# Patient Record
Sex: MALE | Race: WHITE
[De-identification: names, ages, dates, MRNs, and addresses within clinical notes are randomized per-mention and may not be internally consistent; named-entity substitution may affect disease eponyms.]

---

## 2017-01-02 ENCOUNTER — HOSPITAL ENCOUNTER (EMERGENCY)
Dept: HOSPITAL 62 - ER | Age: 57
Discharge: HOME | End: 2017-01-02
Payer: SELF-PAY

## 2017-01-02 VITALS — SYSTOLIC BLOOD PRESSURE: 141 MMHG | DIASTOLIC BLOOD PRESSURE: 77 MMHG

## 2017-01-02 DIAGNOSIS — I10: ICD-10-CM

## 2017-01-02 DIAGNOSIS — E78.00: ICD-10-CM

## 2017-01-02 DIAGNOSIS — Z95.1: ICD-10-CM

## 2017-01-02 DIAGNOSIS — Z88.0: ICD-10-CM

## 2017-01-02 DIAGNOSIS — T81.4XXA: Primary | ICD-10-CM

## 2017-01-02 DIAGNOSIS — Z87.891: ICD-10-CM

## 2017-01-02 DIAGNOSIS — Z88.2: ICD-10-CM

## 2017-01-02 LAB
ALBUMIN SERPL-MCNC: 4.2 G/DL (ref 3.5–5)
ALP SERPL-CCNC: 101 U/L (ref 38–126)
ALT SERPL-CCNC: 27 U/L (ref 21–72)
ANION GAP SERPL CALC-SCNC: 13 MMOL/L (ref 5–19)
APPEARANCE UR: (no result)
AST SERPL-CCNC: 25 U/L (ref 17–59)
BASOPHILS # BLD AUTO: 0.1 10^3/UL (ref 0–0.2)
BASOPHILS NFR BLD AUTO: 1 % (ref 0–2)
BILIRUB DIRECT SERPL-MCNC: 0 MG/DL (ref 0–0.3)
BILIRUB SERPL-MCNC: 0.5 MG/DL (ref 0.2–1.3)
BILIRUB UR QL STRIP: NEGATIVE
BUN SERPL-MCNC: 18 MG/DL (ref 7–20)
CALCIUM: 9.2 MG/DL (ref 8.4–10.2)
CHLORIDE SERPL-SCNC: 105 MMOL/L (ref 98–107)
CK MB SERPL-MCNC: 0.62 NG/ML (ref ?–4.55)
CK SERPL-CCNC: 86 U/L (ref 55–170)
CO2 SERPL-SCNC: 28 MMOL/L (ref 22–30)
CREAT SERPL-MCNC: 0.93 MG/DL (ref 0.52–1.25)
EOSINOPHIL # BLD AUTO: 2.2 10^3/UL (ref 0–0.6)
EOSINOPHIL NFR BLD AUTO: 17.3 % (ref 0–6)
ERYTHROCYTE [DISTWIDTH] IN BLOOD BY AUTOMATED COUNT: 13.4 % (ref 11.5–14)
GLUCOSE SERPL-MCNC: 79 MG/DL (ref 75–110)
GLUCOSE UR STRIP-MCNC: NEGATIVE MG/DL
HCT VFR BLD CALC: 36.4 % (ref 37.9–51)
HGB BLD-MCNC: 12.4 G/DL (ref 13.5–17)
HGB HCT DIFFERENCE: 0.8
KETONES UR STRIP-MCNC: (no result) MG/DL
LYMPHOCYTES # BLD AUTO: 3 10^3/UL (ref 0.5–4.7)
LYMPHOCYTES NFR BLD AUTO: 23.8 % (ref 13–45)
MCH RBC QN AUTO: 28.6 PG (ref 27–33.4)
MCHC RBC AUTO-ENTMCNC: 33.9 G/DL (ref 32–36)
MCV RBC AUTO: 84 FL (ref 80–97)
MONOCYTES # BLD AUTO: 0.8 10^3/UL (ref 0.1–1.4)
MONOCYTES NFR BLD AUTO: 6.4 % (ref 3–13)
NEUTROPHILS # BLD AUTO: 6.5 10^3/UL (ref 1.7–8.2)
NEUTS SEG NFR BLD AUTO: 51.5 % (ref 42–78)
NITRITE UR QL STRIP: NEGATIVE
PH UR STRIP: 6 [PH] (ref 5–9)
POTASSIUM SERPL-SCNC: 4 MMOL/L (ref 3.6–5)
PROT SERPL-MCNC: 6.8 G/DL (ref 6.3–8.2)
PROT UR STRIP-MCNC: 30 MG/DL
RBC # BLD AUTO: 4.32 10^6/UL (ref 4.35–5.55)
SODIUM SERPL-SCNC: 145.8 MMOL/L (ref 137–145)
SP GR UR STRIP: 1.03
TROPONIN I SERPL-MCNC: 0.01 NG/ML
UROBILINOGEN UR-MCNC: 2 MG/DL (ref ?–2)
WBC # BLD AUTO: 12.7 10^3/UL (ref 4–10.5)

## 2017-01-02 PROCEDURE — 93005 ELECTROCARDIOGRAM TRACING: CPT

## 2017-01-02 PROCEDURE — 87077 CULTURE AEROBIC IDENTIFY: CPT

## 2017-01-02 PROCEDURE — 99284 EMERGENCY DEPT VISIT MOD MDM: CPT

## 2017-01-02 PROCEDURE — 85025 COMPLETE CBC W/AUTO DIFF WBC: CPT

## 2017-01-02 PROCEDURE — 87186 SC STD MICRODIL/AGAR DIL: CPT

## 2017-01-02 PROCEDURE — 84484 ASSAY OF TROPONIN QUANT: CPT

## 2017-01-02 PROCEDURE — 93010 ELECTROCARDIOGRAM REPORT: CPT

## 2017-01-02 PROCEDURE — 81001 URINALYSIS AUTO W/SCOPE: CPT

## 2017-01-02 PROCEDURE — 36415 COLL VENOUS BLD VENIPUNCTURE: CPT

## 2017-01-02 PROCEDURE — 80053 COMPREHEN METABOLIC PANEL: CPT

## 2017-01-02 PROCEDURE — 82553 CREATINE MB FRACTION: CPT

## 2017-01-02 PROCEDURE — 71020: CPT

## 2017-01-02 PROCEDURE — 82550 ASSAY OF CK (CPK): CPT

## 2017-01-02 PROCEDURE — 96372 THER/PROPH/DIAG INJ SC/IM: CPT

## 2017-01-02 PROCEDURE — 87205 SMEAR GRAM STAIN: CPT

## 2017-01-02 PROCEDURE — 87070 CULTURE OTHR SPECIMN AEROBIC: CPT

## 2017-01-02 NOTE — EKG REPORT
SEVERITY:- ABNORMAL ECG -

SINUS RHYTHM

PROBABLE LEFT ATRIAL ABNORMALITY

NONSPECIFIC INTRAVENTRICULAR CONDUCTION DELAY

CONSIDER ANTERIOR INFARCT

:

Confirmed by: Roslyn Sol 02-Jan-2017 22:04:50

## 2017-01-02 NOTE — ER DOCUMENT REPORT
ED Medical Screen (RME)





- General


Chief Complaint: Post Surgical Pain


Stated Complaint: POSSIBLE INFECTION


Mode of Arrival: Ambulatory


Information source: Patient


Notes: 


Patient is complaining of upper chest pain, low back pain and pain at incision 

site in epigastric region. He has had pain for the past week - the pain started 

as sharp severe intermittent pain in his lower back that has relieved some. The 

chest pain is described as dull, intermittent, worse with movement. He has 

history [place] [facility] [place] The pain at the incision site 

in upper abdomen that he states is from one of the chest tubes - dark yellow 

pus noted, causing them to change the bandages 3-4 times/daily. endorses 

associated erythema, swelling and pain but denies bleeding from site. 

Associated symptoms include fever (Tmax 101), chills, nausea, SOB, vomiting x1 

episode, diarrhea ut denies dysuria. He has tried aleve, ibuprofen and tylenol 

which is not providing relief. 


Currently on plavix. 


TRAVEL OUTSIDE OF THE [place] IN LAST 30 DAYS: No





- Related Data


Allergies/Adverse Reactions: 


 





Penicillins Allergy (Unknown, Verified 01/02/17 13:21)


 











Past Medical History





- Past Medical History


Cardiac Medical History: Reports: Hx Hypercholesterolemia, Hx Hypertension


   Denies: Hx Coronary Artery Disease


Traumatic Medical History: Reports: Hx Fractures - skull


Past Surgical History: Reports: Other - bladder





- Immunizations


Hx Diphtheria, Pertussis, Tetanus Vaccination: No





Review of Systems





- Review of Systems


Constitutional: See HPI


Cardiovascular: See HPI


Respiratory: See HPI


Skin: See HPI





Physical Exam





- Vital signs


Vitals: 





 











Temp Pulse Resp BP Pulse Ox


 


 97.4 F   75   20   159/87 H  98 


 


 01/02/17 13:17  01/02/17 13:17  01/02/17 13:17  01/02/17 13:17  01/02/17 13:17














- Notes


Notes: 


General: appears comfortable, steady gait, VSS.


Skin: Multiple incisional scars noted to upper chest and abdomen. 1.5 cm 

incision noted with erythema, yellow drainage, warmth and tender to palpation.





Course





- Re-evaluation


Re-evalutation: 


01/02/17 14:28


Patient seen and examined. VSS. Significant cardiac history with recent CABG on 

12/7/16. Ordered lab work, ekg, chest xray. Currently on plavix. 











- Vital Signs


Vital signs: 





 











Temp Pulse Resp BP Pulse Ox


 


 97.4 F   75   20   159/87 H  98 


 


 01/02/17 13:17  01/02/17 13:17  01/02/17 13:17  01/02/17 13:17  01/02/17 13:17

## 2017-01-02 NOTE — ER DOCUMENT REPORT
ED General





- General


Chief Complaint: Post Surgical Pain


Stated Complaint: POSSIBLE INFECTION


Time seen by provider: 15:27


Mode of Arrival: Ambulatory


Information source: Patient, Relative


TRAVEL OUTSIDE OF THE [place] IN LAST 30 DAYS: No





- HPI


Onset: Other - pt states he has had pus draining from one of the incision sites 

from the CABG surgery he had last month at [facility].  He denies fever.  He has had 

brief twinges of chest discomfort after the surgery but nothing sustained.  Has 

had some "kidney pain " over the past few days, but denies dysuria, hematuria





- Related Data


Allergies/Adverse Reactions: 


 





Penicillins Allergy (Unknown, Verified 01/02/17 13:21)


 


Sulfa (Sulfonamide Antibiotics) Allergy (Verified 01/02/17 15:59)


 











Past Medical History





- General


Information source: Patient, Relative





- Social History


Smoking Status: Former Smoker


Cigarette use (# per day): No


Chew tobacco use (# tins/day): No


Smoking Education Provided: No


Family History: CAD





- Past Medical History


Cardiac Medical History: Reports: Hx Hypercholesterolemia, Hx Hypertension


   Denies: Hx Coronary Artery Disease


Traumatic Medical History: Reports: Hx Fractures - skull


Past Surgical History: Reports: Other - bladder





- Immunizations


Hx Diphtheria, Pertussis, Tetanus Vaccination: No





Review of Systems





- Review of Systems


Constitutional: No symptoms reported


Cardiovascular: See HPI, Chest pain


Respiratory: No symptoms reported


Gastrointestinal: No symptoms reported


Musculoskeletal: No symptoms reported


Skin: See HPI, Other - draining wound


Neurological/Psychological: No symptoms reported


-: Yes All other systems reviewed and negative





Physical Exam





- Vital signs


Vitals: 


 











Temp Pulse Resp BP Pulse Ox


 


 97.4 F   75   20   159/87 H  98 


 


 01/02/17 13:17  01/02/17 13:17  01/02/17 13:17  01/02/17 13:17  01/02/17 13:17














- General


General appearance: Appears well


In distress: None





- HEENT


Pharynx: Normal


Neck: Normal





- Respiratory


Respiratory status: No respiratory distress


Chest status: Nontender


Breath sounds: Normal


Chest palpation: Other - there is a 2 cm post-op wound with yellow borders and 

small amount of yellow pus at the entrance.  We will do wound culture today.  

The other post-op woounds on the chest and healing well without signs of 

infection.





- Cardiovascular


Rhythm: Regular


Heart sounds: Normal auscultation





- Abdominal


Inspection: Normal


Tenderness: Nontender





- Back


Back: Normal





- Extremities


General upper extremity: Normal inspection


General lower extremity: Normal inspection





- Neurological


Neuro grossly intact: Yes


Cognition: Normal


Orientation: AAOx4


Speech: Normal





Course





- Re-evaluation


Re-evalutation: 





01/02/17 16:59


pt's exam unchanged -- we will give him a parenteral dose of abc here -- he has 

expressed desire to go home





- Vital Signs


Vital signs: 


 











Temp Pulse Resp BP Pulse Ox


 


 97.4 F   75   14   160/89 H  99 


 


 01/02/17 13:17  01/02/17 13:17  01/02/17 15:34  01/02/17 15:34  01/02/17 15:34














- Laboratory


Result Diagrams: 


 01/02/17 15:20





 01/02/17 15:20


Laboratory results interpreted by me: 


 











  01/02/17 01/02/17 01/02/17





  15:20 15:20 15:50


 


WBC  12.7 H  


 


RBC  4.32 L  


 


Hgb  12.4 L  


 


Hct  36.4 L  


 


Eosinophils %  17.3 H  


 


Absolute Eosinophils  2.2 H  


 


Sodium   145.8 H 


 


Urine Protein    30 H


 


Urine Ketones    TRACE H


 


Urine Urobilinogen    2.0 H


 


Ur Leukocyte Esterase    TRACE H














- EKG Interpretation by Me


EKG shows normal: Sinus rhythm - nsr with inverted T waves in V1 and 2 but no 

acute change





Discharge





- Discharge


Clinical Impression: 


Post-operative infection


Qualifiers:


 Encounter type: initial encounter Qualified Code(s): T81.4XXA - Infection 

following a procedure, initial encounter





Condition: Stable


Disposition: HOME, SELF-CARE


Additional Instructions: 


rest, take meds as prescribed, return if worse


Prescriptions: 


Clindamycin HCl [Cleocin 300 mg Capsule] 300 mg PO BID #14 capsule

## 2017-10-14 ENCOUNTER — HOSPITAL ENCOUNTER (INPATIENT)
Dept: HOSPITAL 62 - ER | Age: 57
LOS: 2 days | Discharge: HOME | DRG: 917 | End: 2017-10-16
Attending: INTERNAL MEDICINE | Admitting: INTERNAL MEDICINE
Payer: MEDICAID
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
## 2017-10-14 DIAGNOSIS — F32.9: ICD-10-CM

## 2017-10-14 DIAGNOSIS — T50.904A: Primary | ICD-10-CM

## 2017-10-14 DIAGNOSIS — Z63.5: ICD-10-CM

## 2017-10-14 DIAGNOSIS — E78.00: ICD-10-CM

## 2017-10-14 DIAGNOSIS — Z88.0: ICD-10-CM

## 2017-10-14 DIAGNOSIS — I10: ICD-10-CM

## 2017-10-14 DIAGNOSIS — I25.10: ICD-10-CM

## 2017-10-14 DIAGNOSIS — I25.2: ICD-10-CM

## 2017-10-14 DIAGNOSIS — Z88.2: ICD-10-CM

## 2017-10-14 DIAGNOSIS — E78.5: ICD-10-CM

## 2017-10-14 DIAGNOSIS — Z95.1: ICD-10-CM

## 2017-10-14 DIAGNOSIS — G92: ICD-10-CM

## 2017-10-14 DIAGNOSIS — Z82.49: ICD-10-CM

## 2017-10-14 LAB
ALBUMIN SERPL-MCNC: 4.5 G/DL (ref 3.5–5)
ALP SERPL-CCNC: 85 U/L (ref 38–126)
ALT SERPL-CCNC: 26 U/L (ref 21–72)
ANION GAP SERPL CALC-SCNC: 12 MMOL/L (ref 5–19)
AST SERPL-CCNC: 28 U/L (ref 17–59)
BARBITURATES UR QL SCN: NEGATIVE
BASOPHILS # BLD AUTO: 0.1 10^3/UL (ref 0–0.2)
BASOPHILS NFR BLD AUTO: 0.6 % (ref 0–2)
BILIRUB DIRECT SERPL-MCNC: 0.4 MG/DL (ref 0–0.4)
BILIRUB SERPL-MCNC: 0.6 MG/DL (ref 0.2–1.3)
BUN SERPL-MCNC: 14 MG/DL (ref 7–20)
CALCIUM: 9.7 MG/DL (ref 8.4–10.2)
CHLORIDE SERPL-SCNC: 107 MMOL/L (ref 98–107)
CO2 SERPL-SCNC: 24 MMOL/L (ref 22–30)
CREAT SERPL-MCNC: 1.17 MG/DL (ref 0.52–1.25)
EOSINOPHIL # BLD AUTO: 0.4 10^3/UL (ref 0–0.6)
EOSINOPHIL NFR BLD AUTO: 2.5 % (ref 0–6)
ERYTHROCYTE [DISTWIDTH] IN BLOOD BY AUTOMATED COUNT: 13.8 % (ref 11.5–14)
GLUCOSE SERPL-MCNC: 89 MG/DL (ref 75–110)
HCT VFR BLD CALC: 44 % (ref 37.9–51)
HGB BLD-MCNC: 15.3 G/DL (ref 13.5–17)
HGB HCT DIFFERENCE: 1.9
LYMPHOCYTES # BLD AUTO: 3.1 10^3/UL (ref 0.5–4.7)
LYMPHOCYTES NFR BLD AUTO: 20.7 % (ref 13–45)
MCH RBC QN AUTO: 29.7 PG (ref 27–33.4)
MCHC RBC AUTO-ENTMCNC: 34.7 G/DL (ref 32–36)
MCV RBC AUTO: 85 FL (ref 80–97)
METHADONE UR QL SCN: NEGATIVE
MONOCYTES # BLD AUTO: 1 10^3/UL (ref 0.1–1.4)
MONOCYTES NFR BLD AUTO: 6.9 % (ref 3–13)
NEUTROPHILS # BLD AUTO: 10.3 10^3/UL (ref 1.7–8.2)
NEUTS SEG NFR BLD AUTO: 69.3 % (ref 42–78)
PCP UR QL SCN: NEGATIVE
POTASSIUM SERPL-SCNC: 4.2 MMOL/L (ref 3.6–5)
PROT SERPL-MCNC: 7.6 G/DL (ref 6.3–8.2)
RBC # BLD AUTO: 5.16 10^6/UL (ref 4.35–5.55)
SODIUM SERPL-SCNC: 143.4 MMOL/L (ref 137–145)
URINE OPIATES LOW: NEGATIVE
WBC # BLD AUTO: 14.9 10^3/UL (ref 4–10.5)

## 2017-10-14 PROCEDURE — 84443 ASSAY THYROID STIM HORMONE: CPT

## 2017-10-14 PROCEDURE — 84484 ASSAY OF TROPONIN QUANT: CPT

## 2017-10-14 PROCEDURE — 93010 ELECTROCARDIOGRAM REPORT: CPT

## 2017-10-14 PROCEDURE — 80048 BASIC METABOLIC PNL TOTAL CA: CPT

## 2017-10-14 PROCEDURE — 99291 CRITICAL CARE FIRST HOUR: CPT

## 2017-10-14 PROCEDURE — 85025 COMPLETE CBC W/AUTO DIFF WBC: CPT

## 2017-10-14 PROCEDURE — 80307 DRUG TEST PRSMV CHEM ANLYZR: CPT

## 2017-10-14 PROCEDURE — 93005 ELECTROCARDIOGRAM TRACING: CPT

## 2017-10-14 PROCEDURE — 80069 RENAL FUNCTION PANEL: CPT

## 2017-10-14 PROCEDURE — 90686 IIV4 VACC NO PRSV 0.5 ML IM: CPT

## 2017-10-14 PROCEDURE — 36415 COLL VENOUS BLD VENIPUNCTURE: CPT

## 2017-10-14 PROCEDURE — 80053 COMPREHEN METABOLIC PANEL: CPT

## 2017-10-14 PROCEDURE — 70450 CT HEAD/BRAIN W/O DYE: CPT

## 2017-10-14 PROCEDURE — 82550 ASSAY OF CK (CPK): CPT

## 2017-10-14 RX ADMIN — SODIUM CHLORIDE AND POTASSIUM CHLORIDE PRN ML: 9; 2.98 INJECTION, SOLUTION INTRAVENOUS at 18:48

## 2017-10-14 RX ADMIN — LANSOPRAZOLE SCH MG: 15 TABLET, ORALLY DISINTEGRATING, DELAYED RELEASE ORAL at 18:40

## 2017-10-14 RX ADMIN — LORAZEPAM PRN MG: 2 INJECTION INTRAMUSCULAR; INTRAVENOUS at 18:42

## 2017-10-14 RX ADMIN — Medication SCH ML: at 21:03

## 2017-10-14 RX ADMIN — HYDRALAZINE HYDROCHLORIDE PRN MG: 20 INJECTION INTRAMUSCULAR; INTRAVENOUS at 21:14

## 2017-10-14 SDOH — SOCIAL STABILITY - SOCIAL INSECURITY: DISRUPTION OF FAMILY BY SEPARATION AND DIVORCE: Z63.5

## 2017-10-14 NOTE — ER DOCUMENT REPORT
ED General





- General


Chief Complaint: Possible Overdose


Stated Complaint: POSSIBLE OVERDOSE


Time Seen by Provider: 10/14/17 13:54


Information source: Patient, Relative, Emergency Med Personnel


Notes: 





Patient is a 57-year-old male with past medical history as recorded to 

supposedly took an unknown quantity of Benadryl on blood pressure medication 

pills secondary to an intentional overdose.  This was obtained by EMS as well 

as by calling his formal wife at [phone].  She supposedly "left him" around 4 

days ago.  She states that he has history of suicidal ideations.  She states 

the patient has had no recent infections, nausea, vomiting, or diarrhea.  She 

is uncertain which medications he took and how many he took.


TRAVEL OUTSIDE OF THE [place] IN LAST 30 DAYS: No





- HPI


Onset: Just prior to arrival


Onset/Duration: Sudden


Quality of pain: No pain


Severity: Severe


Pain Level: Denies


Associated symptoms: Other - Unable to obtain secondary to patient's condition


Exacerbated by: Other - Unable to obtain secondary to patient's condition


Relieved by: Other - Unable to obtain secondary to patient's condition


Similar symptoms previously: No





- Related Data


Allergies/Adverse Reactions: 


 





Penicillins Allergy (Unknown, Verified 01/02/17 13:21)


 


Sulfa (Sulfonamide Antibiotics) Allergy (Verified 01/02/17 15:59)


 











Past Medical History





- General


Information source: Patient





- Social History


Smoking Status: Unknown if Ever Smoked


Cigarette use (# per day): No - Unable to obtain secondary to patient's 

condition


Chew tobacco use (# tins/day): No - Unable to obtain secondary to patient's 

condition


Smoking Education Provided: No - Unable to obtain secondary to patient's 

condition


Frequency of alcohol use: None - Unable to obtain secondary to patient's 

conditionUnable to obtain secondary to patient's condition


Drug Abuse: None - Unable to obtain secondary to patient's condition


Family History: CAD





- Past Medical History


Cardiac Medical History: Reports: Hx Heart Attack, Hx Hypercholesterolemia, Hx 

Hypertension


   Denies: Hx Coronary Artery Disease


Traumatic Medical History: Reports: Hx Fractures - skull


Past Surgical History: Reports: Hx Cardiac Surgery - 5 vessel bypass, Other - 

bladder





- Immunizations


Hx Diphtheria, Pertussis, Tetanus Vaccination: No





Review of Systems





- Review of Systems


-: Yes ROS unobtainable due to patient's medical condition





Physical Exam





- Vital signs


Notes: 





Reviewed vital signs and nursing note as charted by RN.





CONSTITUTIONAL: Alert with mumbling answers.  I am unable to identify exactly 

what the patient is stating.  He appears to be protecting his airway





HEAD: Normocephalic; atraumatic





EYES: Sclerae non-icteric





ENT: Moist mucous membranes; pharynx without lesions noted





NECK: Supple without meningismus; non-tender





CARD: Regular rate and rhythm; no murmurs, no clicks, no rubs, no gallops; 

symmetric distal pulses





RESP: Normal chest excursion without splinting or tachypnea; breath sounds 

clear and equal bilaterally





ABD/GI: Normal bowel sounds; non-distended; soft, non-tender





BACK: The back appears normal and is non-tender to palpation





EXT: Normal ROM in all joints; non-tender to palpation; no cyanosis, no 

effusions, no edema





SKIN: Normal color for age and race; warm; dry; good turgor; capillary refill < 

2 seconds; no acute lesions noted





NEURO: CN II through XII are intact.  Moves all extremities equally; Motor and 

sensory function intact








Course





- Re-evaluation


Re-evalutation: 





10/14/17 15:19


Given the history and physical examination the patient was immediately placed 

on the monitor.  EKG, fluids, CT scan of the head, and labs were drawn.  





We were able to contact the patient's significant other at phone #[phone].





Patient appears to be protecting his airway.  I am concerned about the possible 

beta-blocker overdose.  A dose of intravenous calcium has been ordered.  Vital 

signs are currently stable.





EKG shows a heart rate of 97, possibly an accelerated junctional rhythm.  Right 

bundle branch block with a left posterior fascicular block, inverted T waves in 

lead aVF.  Old EKG from January 2, 2007 shows no obvious appreciable change to 

this EKG except possibly for the aVF inverted T waves.





10/14/17 15:22


Reviewing the patient's blood pressure medications it appears the patient was 

taking an ACE inhibitor with a diuretic.





10/14/17 15:23


CT scan of the head shows no acute abnormalities.





10/14/17 15:32


Patient is now answering simple questions.  He denies any pain.  Vitals as 

recorded.  Patient will be admitted to the hospitalist service until the 

patient is able to be medically cleared.








- Laboratory


Result Diagrams: 


 10/14/17 13:54





 10/14/17 13:54


Laboratory results interpreted by me: 


 











  10/14/17 10/14/17





  13:54 13:54


 


WBC  14.9 H 


 


Absolute Neutrophils  10.3 H 


 


Salicylates   < 1.0 L


 


Acetaminophen   < 10 L














Critical Care Note





- Critical Care Note


Total time excluding time spent on procedures (mins): 40





Discharge





- Discharge


Clinical Impression: 


Overdose


Qualifiers:


 Encounter type: initial encounter Injury intent: intentional self-harm 

Qualified Code(s): T50.902A - Poisoning by unspecified drugs, medicaments and 

biological substances, intentional self-harm, initial encounter





Condition: Fair


Disposition: ADMITTED AS INPATIENT


Admitting Provider: Hospitalist


Unit Admitted: [facility]
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
## 2017-10-14 NOTE — RADIOLOGY REPORT (SQ)
EXAM DESCRIPTION:  CT HEAD WITHOUT



COMPLETED DATE/TIME:  10/14/2017 2:14 pm



REASON FOR STUDY:  20, AMS



COMPARISON:  CT brain 2/19/2016, 1/6/2015



TECHNIQUE:  Axial images acquired through the brain without intravenous contrast.  Images reviewed wi
th bone, brain and subdural windows.  Images stored on PACS.

All CT scanners at this facility use dose modulation, iterative reconstruction, and/or weight based d
osing when appropriate to reduce radiation dose to as low as reasonably achievable (ALARA).

[facility]: Dose Right  [facility]: CareDose    [facility]: Dose Right    [facility]: Teradose 4D    [facility]: Smart Technologies



RADIATION DOSE:  Up-to-date CT equipment and radiation dose reduction techniques were employed. CTDIv
ol: 64.6 mGy. DLP: 1551 mGy-cm. mGy.



LIMITATIONS:  Motion artifact



FINDINGS:  Motion artifact throughout the study.

On images without motion artifact, there is no gross acute large territory ischemic change.  No acute
 hemorrhage.  No mass effect or midline shift.

There is an old infarct in the right caudate.

Postsurgical changes over the right parietal calvarium.



IMPRESSION:  Limited study.  No acute findings.  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

EVIDENCE OF ACUTE STROKE: NO.



COMMENT:  Quality ID # [id number]: Final reports with documentation of one or more dose reduction techniques
 (e.g., Automated exposure control, adjustment of the mA and/or kV according to patient size, use of 
iterative reconstruction technique)



TECHNICAL DOCUMENTATION:  JOB ID:  [id number]

 2011 [employer]- All Rights Reserved

## 2017-10-14 NOTE — EKG REPORT
SEVERITY:- ABNORMAL ECG -

ACCELERATED JUNCTIONAL RHYTHM

RBBB AND LPFB

:

Confirmed by: Paulo Martinez MD 14-Oct-2017 17:28:37

## 2017-10-14 NOTE — HISTORY AND PHYSICAL E
History and Physical



NAME: GINO CRESPO

MRN:  [id number]       : 1960   AGE: 57Y

ADMITTED: 10/14/2017                    ROOM: ED21

 



REASON FOR CONSULTATION:

Altered mental status, drug overdose.



HISTORY OF PRESENT ILLNESS:

The patient is a pleasant 57-year-old male who has a past medical history

of hypertension.  The patient was here last year, 2016.  At that

time he had chest pain and positive stress test.  He was transferred to

[facility].  The other history of his is hyperlipidemia.  The patient

was brought to the emergency room by his wife because of altered mental

status.  He had been  with his wife for the last 4 days.  He took

multiple medication in suicide attempt.  This included multiple Benadryl,

anti-blood pressure medications, and some medications of his friend and is

unknown.  He was confused; however, his vital signs in the emergency room

were unremarkable.  He denied any fever or chills or nausea or vomiting

here to give history, but he is confused.  He also has some paranoia and

shows restlessness and uncooperative and hallucinating.  He denies alcohol

use but he uses marijuana.  He had a CT scan in the emergency room that

was unremarkable.  He received IV fluids, 1 L NS.



REVIEW OF SYSTEMS:

Twelve systems are difficult to obtain.



PAST MEDICAL HISTORY:

1.  Hypertension.

2.  Hyperlipidemia.

3.  Coronary artery disease status post CABG.



PAST SURGICAL HISTORY:

1.  CABG.

2.  History of fracture



ALLERGIES:

1.  Penicillin.

2.  Bactrim.



HOME MEDICATIONS:

Unknown.



SOCIAL HISTORY:

The patient is  from his wife.  He smokes marijuana and no

drinking.



FAMILY HISTORY:

Coronary artery disease.



PHYSICAL EXAMINATION:

GENERAL:  The patient is lying in bed, restless.



VITAL SIGNS:  Blood pressure 136/107, heart rate 72, respiratory rate 30,

saturation 96%, afebrile.



HEENT:  Head is normocephalic, atraumatic.  Pupils are equal, round and

reactive to light and accommodation bilaterally.  Extraocular movements

are intact.  No headache or dizziness.  Ears:  Tympanic membranes intact

bilaterally.  No discharge from the ear, no discharge from the nose.



NECK:  Supple, no increased JVD, no thyromegaly, no lymphadenopathy.



CARDIOVASCULAR:  Normal S1,S2.  Regular rate and rhythm.  No murmur, no

gallop.



RESPIRATORY:  Good air entry bilaterally.  Lungs clear.



ABDOMEN:  Soft, nontender, no organomegaly, no deformity.  Bowel sounds

active.



MUSCULOSKELETAL:  No edema.



PERIPHERAL VASCULAR:  Peripheral pulses palpable.



SKIN:  There is no rash.



PSYCHIATRIC:  Anxious, agitated.



NEUROLOGIC:  No seizure, no history of seizures.



HEMATOLOGIC/LYMPHOCYTIC:  No anemia, no easy bruising.



DIAGNOSTIC DATA:

Labs:  White blood count 15.  Sodium 143, potassium 4.2, creatinine 1.1.



CT scan of the head is normal.



ASSESSMENT:

1.  Altered mental status, probably secondary to drug overdose, is

unknown.

2.  Drug overdose, possible Benadryl and antihypertensive medications.

3.  Coronary artery disease, status post CABG.

4.  Hyperlipidemia.

5.  Hypertension.

6.  Leukocytosis.



PLAN:

1.  The patient will be admitted.

2.  We will start him on IV fluids, D5 NS at 125 mL/h.

3.  Multivitamin.

4.  Ativan p.r.n.

5.  Morphine p.r.n.

6.  Zofran 40 mg p.r.n.

7.  Hydralazine p.r.n.

8.  Psychiatric consult.

9.  Labs tomorrow morning, we will add also PTH or TSH to the labs.



TIME SPENT:

One hour.





DICTATING PHYSICIAN: CHRISTAL THOMPSON M.D.





[id number]                  DT: 10/14/2017    1642

PHY#: [id number]            DD: 10/14/2017    1622

ID:   [id number]           JOB#: [id number]       ACCT: [id number]



cc:CHRISTAL THOMPSON M.D.

>







MTDD

## 2017-10-15 LAB
ANION GAP SERPL CALC-SCNC: 12 MMOL/L (ref 5–19)
BASOPHILS # BLD AUTO: 0.1 10^3/UL (ref 0–0.2)
BASOPHILS NFR BLD AUTO: 0.3 % (ref 0–2)
BUN SERPL-MCNC: 13 MG/DL (ref 7–20)
CALCIUM: 9.7 MG/DL (ref 8.4–10.2)
CHLORIDE SERPL-SCNC: 109 MMOL/L (ref 98–107)
CO2 SERPL-SCNC: 23 MMOL/L (ref 22–30)
CREAT SERPL-MCNC: 1.1 MG/DL (ref 0.52–1.25)
EOSINOPHIL # BLD AUTO: 0.2 10^3/UL (ref 0–0.6)
EOSINOPHIL NFR BLD AUTO: 1.4 % (ref 0–6)
ERYTHROCYTE [DISTWIDTH] IN BLOOD BY AUTOMATED COUNT: 13.9 % (ref 11.5–14)
GLUCOSE SERPL-MCNC: 103 MG/DL (ref 75–110)
HCT VFR BLD CALC: 43.4 % (ref 37.9–51)
HGB BLD-MCNC: 14.8 G/DL (ref 13.5–17)
HGB HCT DIFFERENCE: 1
LYMPHOCYTES # BLD AUTO: 2.8 10^3/UL (ref 0.5–4.7)
LYMPHOCYTES NFR BLD AUTO: 16.8 % (ref 13–45)
MCH RBC QN AUTO: 29.4 PG (ref 27–33.4)
MCHC RBC AUTO-ENTMCNC: 34.2 G/DL (ref 32–36)
MCV RBC AUTO: 86 FL (ref 80–97)
MONOCYTES # BLD AUTO: 0.9 10^3/UL (ref 0.1–1.4)
MONOCYTES NFR BLD AUTO: 5.5 % (ref 3–13)
NEUTROPHILS # BLD AUTO: 12.4 10^3/UL (ref 1.7–8.2)
NEUTS SEG NFR BLD AUTO: 76 % (ref 42–78)
POTASSIUM SERPL-SCNC: 3.7 MMOL/L (ref 3.6–5)
RBC # BLD AUTO: 5.05 10^6/UL (ref 4.35–5.55)
SODIUM SERPL-SCNC: 144.4 MMOL/L (ref 137–145)
WBC # BLD AUTO: 16.3 10^3/UL (ref 4–10.5)

## 2017-10-15 PROCEDURE — 3E0F73Z INTRODUCTION OF ANTI-INFLAMMATORY INTO RESPIRATORY TRACT, VIA NATURAL OR ARTIFICIAL OPENING: ICD-10-PCS | Performed by: FAMILY MEDICINE

## 2017-10-15 PROCEDURE — 3E0234Z INTRODUCTION OF SERUM, TOXOID AND VACCINE INTO MUSCLE, PERCUTANEOUS APPROACH: ICD-10-PCS | Performed by: FAMILY MEDICINE

## 2017-10-15 RX ADMIN — LANSOPRAZOLE SCH MG: 15 TABLET, ORALLY DISINTEGRATING, DELAYED RELEASE ORAL at 16:23

## 2017-10-15 RX ADMIN — SODIUM CHLORIDE AND POTASSIUM CHLORIDE PRN ML: 9; 2.98 INJECTION, SOLUTION INTRAVENOUS at 16:23

## 2017-10-15 RX ADMIN — SODIUM CHLORIDE AND POTASSIUM CHLORIDE PRN ML: 9; 2.98 INJECTION, SOLUTION INTRAVENOUS at 08:16

## 2017-10-15 RX ADMIN — HYDRALAZINE HYDROCHLORIDE PRN MG: 20 INJECTION INTRAMUSCULAR; INTRAVENOUS at 09:19

## 2017-10-15 RX ADMIN — Medication SCH ML: at 13:29

## 2017-10-15 RX ADMIN — FOLIC ACID SCH MG: 1 TABLET ORAL at 09:19

## 2017-10-15 RX ADMIN — LORAZEPAM PRN MG: 2 INJECTION INTRAMUSCULAR; INTRAVENOUS at 00:40

## 2017-10-15 RX ADMIN — MULTIVITAMIN TABLET SCH TAB: TABLET at 09:19

## 2017-10-15 RX ADMIN — ENOXAPARIN SODIUM SCH MG: 40 INJECTION SUBCUTANEOUS at 09:18

## 2017-10-15 RX ADMIN — Medication SCH MG: at 09:19

## 2017-10-15 RX ADMIN — Medication SCH ML: at 21:13

## 2017-10-15 RX ADMIN — Medication SCH ML: at 05:36

## 2017-10-15 RX ADMIN — LANSOPRAZOLE SCH MG: 15 TABLET, ORALLY DISINTEGRATING, DELAYED RELEASE ORAL at 05:36

## 2017-10-15 RX ADMIN — LISINOPRIL SCH MG: 10 TABLET ORAL at 08:34

## 2017-10-15 NOTE — PROGRESS NOTE E
Progress Note



NAME: GINO CRESPO

MRN: [id number]

: 1960             AGE: 57Y

DATE:  10/15/2017                    ROOM: 323



SUBJECTIVE:

The patient is a 57-year-old male, who has a past medical history of

hypertension.  He has coronary artery disease, had CABG.  The patient was

admitted yesterday with drug overdose.  It is unknown what he took. 

Probably he took Benadryl and antihypertensive medications.  Today, he is

more awake, alert, oriented.  The patient was seen briefly yesterday by

psychiatrist in the ER and they wanted the patient medically cleared.  He

is feeling much better today.



OBJECTIVE:

GENERAL:  Patient lying in bed, comfortable.  Not in distress.



VITAL SIGNS:  Blood pressure 185/105, temperature 98, respirations 28,

heart rate 71, saturation 100% on room air.



HEENT:  Normocephalic, atraumatic.  Pupils are round, reactive, and equal

to light and accommodation bilaterally.  Extraocular movements intact. 

Ears:  Tympanic membranes intact bilaterally.  No discharge from the ears.

No discharge from the nose.



NECK:  Supple.  No increased JVD.  No thyromegaly.  No lymphadenopathy.



CARDIOVASCULAR:  Normal S1 and S2.  Regular rate and rhythm.  No murmur. 

No gallop.



RESPIRATORY:  Lungs clear.



ABDOMEN:  Soft. Nontender.



MUSCULOSKELETAL:  No edema.



NEUROLOGIC:  Awake, alert.



DIAGNOSTIC DATA:

White blood count 16, hemoglobin 14, sodium 144, potassium 3.7, creatinine

1.1.



ASSESSMENT:

1.  ENCEPHALOPATHY, PROBABLY SECONDARY TO DRUG OVERDOSE.

2.  DRUG OVERDOSE WITH BENADRYL AND ANTIHYPERTENSIVE MEDICATION.

3.  DEPRESSION, WHICH IS 5/10.

4.  LEUKOCYTOSIS.

5.  HYPERTENSION, POORLY CONTROLLED.



PLAN:

We will continue IV fluids and continue monitoring.  Physical therapy. 

Control his blood pressure.  Await psychiatric evaluation.









DICTATING PHYSICIAN:  CHRISTAL THOMPSON M.D.





[id number]                  DT: 10/15/2017    0826

PHY#: [id number]            DD: 10/15/2017    0804

ID:   [id number]           JOB#: [id number]       ACCT: [id number]



cc:

>

## 2017-10-16 VITALS — SYSTOLIC BLOOD PRESSURE: 179 MMHG | DIASTOLIC BLOOD PRESSURE: 100 MMHG

## 2017-10-16 LAB
ALBUMIN SERPL-MCNC: 3.9 G/DL (ref 3.5–5)
ANION GAP SERPL CALC-SCNC: 13 MMOL/L (ref 5–19)
BASOPHILS # BLD AUTO: 0.1 10^3/UL (ref 0–0.2)
BASOPHILS NFR BLD AUTO: 0.8 % (ref 0–2)
BUN SERPL-MCNC: 15 MG/DL (ref 7–20)
CALCIUM: 9.3 MG/DL (ref 8.4–10.2)
CHLORIDE SERPL-SCNC: 111 MMOL/L (ref 98–107)
CO2 SERPL-SCNC: 20 MMOL/L (ref 22–30)
CREAT SERPL-MCNC: 1.12 MG/DL (ref 0.52–1.25)
EOSINOPHIL # BLD AUTO: 0.4 10^3/UL (ref 0–0.6)
EOSINOPHIL NFR BLD AUTO: 2.7 % (ref 0–6)
ERYTHROCYTE [DISTWIDTH] IN BLOOD BY AUTOMATED COUNT: 14 % (ref 11.5–14)
GLUCOSE SERPL-MCNC: 99 MG/DL (ref 75–110)
HCT VFR BLD CALC: 42.5 % (ref 37.9–51)
HGB BLD-MCNC: 14.5 G/DL (ref 13.5–17)
HGB HCT DIFFERENCE: 1
LYMPHOCYTES # BLD AUTO: 2.6 10^3/UL (ref 0.5–4.7)
LYMPHOCYTES NFR BLD AUTO: 18.8 % (ref 13–45)
MCH RBC QN AUTO: 29.5 PG (ref 27–33.4)
MCHC RBC AUTO-ENTMCNC: 34.1 G/DL (ref 32–36)
MCV RBC AUTO: 87 FL (ref 80–97)
MONOCYTES # BLD AUTO: 0.9 10^3/UL (ref 0.1–1.4)
MONOCYTES NFR BLD AUTO: 6.2 % (ref 3–13)
NEUTROPHILS # BLD AUTO: 10 10^3/UL (ref 1.7–8.2)
NEUTS SEG NFR BLD AUTO: 71.5 % (ref 42–78)
PHOSPHATE SERPL-MCNC: 3.1 MG/DL (ref 2.5–4.5)
POTASSIUM SERPL-SCNC: 3.9 MMOL/L (ref 3.6–5)
RBC # BLD AUTO: 4.91 10^6/UL (ref 4.35–5.55)
SODIUM SERPL-SCNC: 144.3 MMOL/L (ref 137–145)
WBC # BLD AUTO: 14 10^3/UL (ref 4–10.5)

## 2017-10-16 RX ADMIN — Medication SCH ML: at 13:07

## 2017-10-16 RX ADMIN — LANSOPRAZOLE SCH MG: 15 TABLET, ORALLY DISINTEGRATING, DELAYED RELEASE ORAL at 16:50

## 2017-10-16 RX ADMIN — MULTIVITAMIN TABLET SCH TAB: TABLET at 10:34

## 2017-10-16 RX ADMIN — HYDRALAZINE HYDROCHLORIDE PRN MG: 20 INJECTION INTRAMUSCULAR; INTRAVENOUS at 04:13

## 2017-10-16 RX ADMIN — Medication SCH ML: at 05:34

## 2017-10-16 RX ADMIN — LISINOPRIL SCH MG: 10 TABLET ORAL at 08:14

## 2017-10-16 RX ADMIN — LANSOPRAZOLE SCH MG: 15 TABLET, ORALLY DISINTEGRATING, DELAYED RELEASE ORAL at 07:00

## 2017-10-16 RX ADMIN — FOLIC ACID SCH MG: 1 TABLET ORAL at 10:34

## 2017-10-16 RX ADMIN — SODIUM CHLORIDE AND POTASSIUM CHLORIDE PRN ML: 9; 2.98 INJECTION, SOLUTION INTRAVENOUS at 10:45

## 2017-10-16 RX ADMIN — ENOXAPARIN SODIUM SCH MG: 40 INJECTION SUBCUTANEOUS at 10:36

## 2017-10-16 RX ADMIN — Medication SCH MG: at 10:34

## 2017-10-16 RX ADMIN — SODIUM CHLORIDE AND POTASSIUM CHLORIDE PRN ML: 9; 2.98 INJECTION, SOLUTION INTRAVENOUS at 00:00

## 2017-10-16 NOTE — PDOC DISCHARGE SUMMARY
General





- Admit/Disc Date/PCP


Admission Date/Primary Care Provider: 


  10/14/17 15:59





  


Primary Care MD: [facility]


Discharge Date: 10/16/17





- Discharge Diagnosis


(1) Toxic metabolic encephalopathy


Summary: 


Secondary to medication overdose.  Resolved








(2) Drug overdose, multiple drugs


Summary: 


At this point it is unclear whether this was an intentional overdose or not.  

The patient was placed on IVC papers.  He was evaluated by psychiatry on the 

day of discharge and his IVC was rescinded.  He will follow-up as an outpatient.








(3) Depression


Summary: 


He has been started on citalopram








(4) Coronary artery disease


Summary: 


Status post recent CABG.  Stable.  He will resume his home medications








(5) Hyperlipidemia


Summary: 


Continue home regimen








(6) Hypertension


Summary: 


Continue home regimen








(7) Leukocytosis


Summary: 


Likely reactive.  No evidence of infection








- Additional Information


Resuscitation Status: Full Code


Discharge Diet: Cardiac


Discharge Activity: Activity As Tolerated, Balance Activity w/Rest, Slowly 

Increase Activity


Home Medications: 








Citalopram Hydrobromide [Celexa 20 mg Tablet] 20 mg PO DAILY #30 tablet 10/16/

17 


Lisinopril [Prinivil 10 mg Tablet] 10 mg PO DAILY@0900 #30 tablet 10/16/17 











History of Present Illness


History of Present Illness: 


GINO CRESPO is a 57 year old male who presented to the emergency room with 

altered mental status








Hospital Course


Hospital Course: 





The patient is a pleasant 57-year-old  male with a past medical 

history significant for hypertension, hyperlipidemia and coronary artery 

disease.  In December 2016 the patient presented to the emergency room here 

with chest pain and a positive stress test.  He was transferred to [facility] where he underwent CABG as he was found to have multivessel 

disease.  Since that time the patient has been somewhat depressed.  He had been 

having marital difficulties at home and apparently took multiple medications 

and what appeared to be possible suicide attempt.  He took multiple Benadryl as 

well as anti-blood pressure medications and some medications of his friends 

which were unknown at the time of admission.  When he presented to the he was 

brought to the emergency room he was confused and I am unable to give an 

accurate history.  He was admitted to the hospital and placed under IVC papers.

  Over the next couple of days the patient's encephalopathy resolved.  He was 

seen by psychiatry and he has been started on antidepressive medication.  The 

patient states that he and his wife are doing much better at this point.  He 

states that he did not intend to kill himself and he just got confused with his 

medications.  In any event his IVC papers were rescinded today.  It is felt 

that he is stable for discharge with close outpatient follow-up.  We will make 

an appointment with his primary care provider and write prescriptions for his 

depression medicine going forward.  At this point maximum hospital benefits 

been reached.  Patient will be discharged home today in stable condition.





Physical Exam


Vital Signs: 


 











Temp Pulse Resp BP Pulse Ox


 


 98.7 F   59 L  16   161/83 H  98 


 


 10/16/17 15:10  10/16/17 15:10  10/16/17 15:10  10/16/17 15:10  10/16/17 15:10








 Intake & Output











 10/15/17 10/16/17 10/17/17





 06:59 06:59 06:59


 


Intake Total 900 4150 400


 


Output Total 525 700 300


 


Balance 375 3450 100


 


Weight 90.6 kg 92.2 kg 











General appearance: PRESENT: no acute distress, well-developed, well-nourished


Head exam: PRESENT: atraumatic, normocephalic


Mouth exam: PRESENT: moist, tongue midline


Respiratory exam: PRESENT: clear to auscultation elpidio.  ABSENT: rales, rhonchi, 

wheezes


Cardiovascular exam: PRESENT: RRR.  ABSENT: diastolic murmur, rubs, systolic 

murmur


GI/Abdominal exam: PRESENT: normal bowel sounds, soft.  ABSENT: distended, 

guarding, mass, organolmegaly, rebound, tenderness


Rectal exam: PRESENT: deferred


Extremities exam: PRESENT: full ROM.  ABSENT: calf tenderness, clubbing, pedal 

edema


Neurological exam: PRESENT: alert, awake, oriented to person, oriented to place

, oriented to time, oriented to situation, CN II-XII grossly intact.  ABSENT: 

motor sensory deficit


Psychiatric exam: PRESENT: appropriate affect, normal mood.  ABSENT: homicidal 

ideation, suicidal ideation


Skin exam: PRESENT: dry, intact, warm.  ABSENT: cyanosis, rash





Results


Laboratory Results: 


 





 10/16/17 05:23 





 10/16/17 05:23 





 











  10/16/17 10/16/17





  05:23 05:23


 


WBC  14.0 H 


 


RBC  4.91 


 


Hgb  14.5 


 


Hct  42.5 


 


MCV  87 


 


MCH  29.5 


 


MCHC  34.1 


 


RDW  14.0 


 


Plt Count  241 


 


Seg Neutrophils %  71.5 


 


Lymphocytes %  18.8 


 


Monocytes %  6.2 


 


Eosinophils %  2.7 


 


Basophils %  0.8 


 


Absolute Neutrophils  10.0 H 


 


Absolute Lymphocytes  2.6 


 


Absolute Monocytes  0.9 


 


Absolute Eosinophils  0.4 


 


Absolute Basophils  0.1 


 


Sodium   144.3


 


Potassium   3.9


 


Chloride   111 H


 


Carbon Dioxide   20 L


 


Anion Gap   13


 


BUN   15


 


Creatinine   1.12


 


Est GFR ( Amer)   > 60


 


Est GFR (Non-Af Amer)   > 60


 


Glucose   99


 


Calcium   9.3


 


Phosphorus   3.1


 


Albumin   3.9











Impressions: 


 





Head CT  10/14/17 13:55


IMPRESSION:  Limited study.  No acute findings.  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 

ganglia.


EVIDENCE OF ACUTE STROKE: NO.


 














Qualifiers


**PATEINT BEING DISCHARGED WITH ANY OF THE FOLLOWING DIAGNOSIS?: No





Plan


Time Spent: Greater than 30 Minutes

## 2017-10-17 ENCOUNTER — HOSPITAL ENCOUNTER (EMERGENCY)
Dept: HOSPITAL 62 - ER | Age: 57
LOS: 1 days | Discharge: HOME | End: 2017-10-18
Payer: COMMERCIAL

## 2017-10-17 DIAGNOSIS — F17.200: ICD-10-CM

## 2017-10-17 DIAGNOSIS — Z95.1: ICD-10-CM

## 2017-10-17 DIAGNOSIS — Z88.0: ICD-10-CM

## 2017-10-17 DIAGNOSIS — Z91.5: ICD-10-CM

## 2017-10-17 DIAGNOSIS — I25.2: ICD-10-CM

## 2017-10-17 DIAGNOSIS — F32.9: Primary | ICD-10-CM

## 2017-10-17 DIAGNOSIS — Z88.2: ICD-10-CM

## 2017-10-17 DIAGNOSIS — R45.851: ICD-10-CM

## 2017-10-17 DIAGNOSIS — Z59.0: ICD-10-CM

## 2017-10-17 DIAGNOSIS — Z63.0: ICD-10-CM

## 2017-10-17 DIAGNOSIS — I10: ICD-10-CM

## 2017-10-17 LAB
ALBUMIN SERPL-MCNC: 4.4 G/DL (ref 3.5–5)
ALP SERPL-CCNC: 89 U/L (ref 38–126)
ALT SERPL-CCNC: 32 U/L (ref 21–72)
ANION GAP SERPL CALC-SCNC: 12 MMOL/L (ref 5–19)
APPEARANCE UR: CLEAR
AST SERPL-CCNC: 29 U/L (ref 17–59)
BARBITURATES UR QL SCN: NEGATIVE
BASOPHILS # BLD AUTO: 0.1 10^3/UL (ref 0–0.2)
BASOPHILS NFR BLD AUTO: 0.5 % (ref 0–2)
BILIRUB DIRECT SERPL-MCNC: 0.5 MG/DL (ref 0–0.4)
BILIRUB SERPL-MCNC: 1 MG/DL (ref 0.2–1.3)
BILIRUB UR QL STRIP: NEGATIVE
BUN SERPL-MCNC: 16 MG/DL (ref 7–20)
CALCIUM: 9.7 MG/DL (ref 8.4–10.2)
CHLORIDE SERPL-SCNC: 107 MMOL/L (ref 98–107)
CO2 SERPL-SCNC: 25 MMOL/L (ref 22–30)
CREAT SERPL-MCNC: 1.32 MG/DL (ref 0.52–1.25)
EOSINOPHIL # BLD AUTO: 0.2 10^3/UL (ref 0–0.6)
EOSINOPHIL NFR BLD AUTO: 1.4 % (ref 0–6)
ERYTHROCYTE [DISTWIDTH] IN BLOOD BY AUTOMATED COUNT: 13.6 % (ref 11.5–14)
ETHANOL SERPL-MCNC: < 10 MG/DL
GLUCOSE SERPL-MCNC: 88 MG/DL (ref 75–110)
GLUCOSE UR STRIP-MCNC: NEGATIVE MG/DL
HCT VFR BLD CALC: 42.1 % (ref 37.9–51)
HGB BLD-MCNC: 14.3 G/DL (ref 13.5–17)
HGB HCT DIFFERENCE: 0.8
KETONES UR STRIP-MCNC: NEGATIVE MG/DL
LYMPHOCYTES # BLD AUTO: 1.9 10^3/UL (ref 0.5–4.7)
LYMPHOCYTES NFR BLD AUTO: 13.6 % (ref 13–45)
MCH RBC QN AUTO: 29.4 PG (ref 27–33.4)
MCHC RBC AUTO-ENTMCNC: 34 G/DL (ref 32–36)
MCV RBC AUTO: 87 FL (ref 80–97)
METHADONE UR QL SCN: NEGATIVE
MONOCYTES # BLD AUTO: 0.8 10^3/UL (ref 0.1–1.4)
MONOCYTES NFR BLD AUTO: 5.5 % (ref 3–13)
NEUTROPHILS # BLD AUTO: 11.3 10^3/UL (ref 1.7–8.2)
NEUTS SEG NFR BLD AUTO: 79 % (ref 42–78)
NITRITE UR QL STRIP: NEGATIVE
PCP UR QL SCN: NEGATIVE
PH UR STRIP: 5 [PH] (ref 5–9)
POTASSIUM SERPL-SCNC: 4.9 MMOL/L (ref 3.6–5)
PROT SERPL-MCNC: 6.9 G/DL (ref 6.3–8.2)
PROT UR STRIP-MCNC: NEGATIVE MG/DL
RBC # BLD AUTO: 4.87 10^6/UL (ref 4.35–5.55)
SODIUM SERPL-SCNC: 143.8 MMOL/L (ref 137–145)
SP GR UR STRIP: 1.01
URINE OPIATES LOW: NEGATIVE
UROBILINOGEN UR-MCNC: NEGATIVE MG/DL (ref ?–2)
WBC # BLD AUTO: 14.3 10^3/UL (ref 4–10.5)

## 2017-10-17 PROCEDURE — 80053 COMPREHEN METABOLIC PANEL: CPT

## 2017-10-17 PROCEDURE — 93005 ELECTROCARDIOGRAM TRACING: CPT

## 2017-10-17 PROCEDURE — 81001 URINALYSIS AUTO W/SCOPE: CPT

## 2017-10-17 PROCEDURE — 99285 EMERGENCY DEPT VISIT HI MDM: CPT

## 2017-10-17 PROCEDURE — 80307 DRUG TEST PRSMV CHEM ANLYZR: CPT

## 2017-10-17 PROCEDURE — 36415 COLL VENOUS BLD VENIPUNCTURE: CPT

## 2017-10-17 PROCEDURE — 93010 ELECTROCARDIOGRAM REPORT: CPT

## 2017-10-17 PROCEDURE — 85025 COMPLETE CBC W/AUTO DIFF WBC: CPT

## 2017-10-17 SDOH — SOCIAL STABILITY - SOCIAL INSECURITY: PROBLEMS IN RELATIONSHIP WITH SPOUSE OR PARTNER: Z63.0

## 2017-10-17 SDOH — ECONOMIC STABILITY - HOUSING INSECURITY: HOMELESSNESS: Z59.0

## 2017-10-17 NOTE — EKG REPORT
SEVERITY:- ABNORMAL ECG -

SINUS RHYTHM

PROBABLE LEFT ATRIAL ABNORMALITY

RIGHT BUNDLE BRANCH BLOCK

PROBABLE INFERIOR INFARCT, AGE INDETERMINATE

:

Confirmed by: Amy Hernández MD 17-Oct-2017 13:24:39
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
## 2017-10-17 NOTE — ER DOCUMENT REPORT
ED Medical Screen (RME)





- General


Chief Complaint: Psych Problem


Stated Complaint: PSYCH EVAL


Time Seen by Provider: 10/17/17 12:28


Notes: 





Patient states that he is having thoughts of wanting to kill himself.  He also 

states that he is having pain in his flanks bilaterally and has been having 

some problems urinating.  He was brought in by mobile crisis.


TRAVEL OUTSIDE OF THE [place] IN LAST 30 DAYS: No





- Related Data


Allergies/Adverse Reactions: 


 





Penicillins Allergy (Unknown, Verified 10/17/17 12:20)


 


Sulfa (Sulfonamide Antibiotics) Allergy (Verified 10/17/17 12:20)


 











Past Medical History





- Past Medical History


Cardiac Medical History: Reports: Hx Heart Attack, Hx Hypercholesterolemia, Hx 

Hypertension


   Denies: Hx Coronary Artery Disease


Renal/ Medical History: Denies: Hx Peritoneal Dialysis


Traumatic Medical History: Reports: Hx Fractures - skull


Past Surgical History: Reports: Hx Cardiac Surgery - 5 vessel bypass, Other - 

bladder





- Immunizations


Hx Diphtheria, Pertussis, Tetanus Vaccination: No


History of Influenza Vaccine for 10/2017 - 3/2018 Season: No





Physical Exam





- Vital signs


Vitals: 





 











Temp Pulse BP Pulse Ox


 


 98.6 F   75   165/93 H  97 


 


 10/17/17 12:19  10/17/17 12:19  10/17/17 12:19  10/17/17 12:19














Course





- Vital Signs


Vital signs: 





 











Temp Pulse Resp BP Pulse Ox


 


 98.6 F   75      165/93 H  97 


 


 10/17/17 12:19  10/17/17 12:19     10/17/17 12:19  10/17/17 12:19

## 2017-10-18 VITALS — SYSTOLIC BLOOD PRESSURE: 165 MMHG | DIASTOLIC BLOOD PRESSURE: 108 MMHG

## 2017-10-18 NOTE — ER DOCUMENT REPORT
Doctor's Note


Notes: 





10/18/17 09:46


The patient's blood pressure has been trending high.  He is known to have high 

blood pressure and be noncompliant.  He was given a prescription on discharge 2 

days ago for lisinopril and claims he could not fill it because he has no 

money.  He is a heavy smoker.  The prescription cost $4 a month.  He was given 

1 dose of lisinopril now and encouraged to get his prescription for his blood 

pressure medication filled.

## 2018-04-19 ENCOUNTER — HOSPITAL ENCOUNTER (EMERGENCY)
Dept: HOSPITAL 62 - ER | Age: 58
Discharge: HOME | End: 2018-04-19
Payer: MEDICAID

## 2018-04-19 VITALS — SYSTOLIC BLOOD PRESSURE: 99 MMHG | DIASTOLIC BLOOD PRESSURE: 51 MMHG

## 2018-04-19 DIAGNOSIS — R11.2: ICD-10-CM

## 2018-04-19 DIAGNOSIS — Z91.14: ICD-10-CM

## 2018-04-19 DIAGNOSIS — E78.5: ICD-10-CM

## 2018-04-19 DIAGNOSIS — E78.00: ICD-10-CM

## 2018-04-19 DIAGNOSIS — R06.02: ICD-10-CM

## 2018-04-19 DIAGNOSIS — I10: ICD-10-CM

## 2018-04-19 DIAGNOSIS — Z95.1: ICD-10-CM

## 2018-04-19 DIAGNOSIS — R07.9: ICD-10-CM

## 2018-04-19 DIAGNOSIS — R10.9: ICD-10-CM

## 2018-04-19 DIAGNOSIS — I25.2: ICD-10-CM

## 2018-04-19 DIAGNOSIS — R06.00: Primary | ICD-10-CM

## 2018-04-19 LAB
ADD MANUAL DIFF: NO
ALBUMIN SERPL-MCNC: 4.3 G/DL (ref 3.5–5)
ALP SERPL-CCNC: 86 U/L (ref 38–126)
ALT SERPL-CCNC: 25 U/L (ref 21–72)
ANION GAP SERPL CALC-SCNC: 11 MMOL/L (ref 5–19)
AST SERPL-CCNC: 26 U/L (ref 17–59)
BASOPHILS # BLD AUTO: 0.1 10^3/UL (ref 0–0.2)
BASOPHILS NFR BLD AUTO: 0.6 % (ref 0–2)
BILIRUB DIRECT SERPL-MCNC: 0.3 MG/DL (ref 0–0.4)
BILIRUB SERPL-MCNC: 0.4 MG/DL (ref 0.2–1.3)
BUN SERPL-MCNC: 20 MG/DL (ref 7–20)
CALCIUM: 9.3 MG/DL (ref 8.4–10.2)
CHLORIDE SERPL-SCNC: 107 MMOL/L (ref 98–107)
CK MB SERPL-MCNC: 1.15 NG/ML (ref ?–4.55)
CK SERPL-CCNC: 157 U/L (ref 55–170)
CO2 SERPL-SCNC: 25 MMOL/L (ref 22–30)
EOSINOPHIL # BLD AUTO: 0.1 10^3/UL (ref 0–0.6)
EOSINOPHIL NFR BLD AUTO: 1 % (ref 0–6)
ERYTHROCYTE [DISTWIDTH] IN BLOOD BY AUTOMATED COUNT: 14.4 % (ref 11.5–14)
GLUCOSE SERPL-MCNC: 98 MG/DL (ref 75–110)
HCT VFR BLD CALC: 45 % (ref 37.9–51)
HGB BLD-MCNC: 15.2 G/DL (ref 13.5–17)
LYMPHOCYTES # BLD AUTO: 2.2 10^3/UL (ref 0.5–4.7)
LYMPHOCYTES NFR BLD AUTO: 17.3 % (ref 13–45)
MCH RBC QN AUTO: 28.9 PG (ref 27–33.4)
MCHC RBC AUTO-ENTMCNC: 33.8 G/DL (ref 32–36)
MCV RBC AUTO: 86 FL (ref 80–97)
MONOCYTES # BLD AUTO: 0.8 10^3/UL (ref 0.1–1.4)
MONOCYTES NFR BLD AUTO: 6.5 % (ref 3–13)
NEUTROPHILS # BLD AUTO: 9.6 10^3/UL (ref 1.7–8.2)
NEUTS SEG NFR BLD AUTO: 74.6 % (ref 42–78)
PLATELET # BLD: 230 10^3/UL (ref 150–450)
POTASSIUM SERPL-SCNC: 3.7 MMOL/L (ref 3.6–5)
PROT SERPL-MCNC: 6.9 G/DL (ref 6.3–8.2)
RBC # BLD AUTO: 5.27 10^6/UL (ref 4.35–5.55)
SODIUM SERPL-SCNC: 143 MMOL/L (ref 137–145)
TOTAL CELLS COUNTED % (AUTO): 100 %
TROPONIN I SERPL-MCNC: < 0.012 NG/ML
WBC # BLD AUTO: 12.8 10^3/UL (ref 4–10.5)

## 2018-04-19 PROCEDURE — 36415 COLL VENOUS BLD VENIPUNCTURE: CPT

## 2018-04-19 PROCEDURE — 82550 ASSAY OF CK (CPK): CPT

## 2018-04-19 PROCEDURE — 71045 X-RAY EXAM CHEST 1 VIEW: CPT

## 2018-04-19 PROCEDURE — 80053 COMPREHEN METABOLIC PANEL: CPT

## 2018-04-19 PROCEDURE — 93005 ELECTROCARDIOGRAM TRACING: CPT

## 2018-04-19 PROCEDURE — 93010 ELECTROCARDIOGRAM REPORT: CPT

## 2018-04-19 PROCEDURE — 82553 CREATINE MB FRACTION: CPT

## 2018-04-19 PROCEDURE — 99285 EMERGENCY DEPT VISIT HI MDM: CPT

## 2018-04-19 PROCEDURE — 85025 COMPLETE CBC W/AUTO DIFF WBC: CPT

## 2018-04-19 PROCEDURE — 84484 ASSAY OF TROPONIN QUANT: CPT
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
## 2018-04-19 NOTE — EKG REPORT
SEVERITY:- ABNORMAL ECG -

SINUS RHYTHM

PROBABLE LEFT ATRIAL ABNORMALITY

RIGHT BUNDLE BRANCH BLOCK

:

Confirmed by: Roslyn Sol 19-Apr-2018 23:28:23

## 2018-04-19 NOTE — RADIOLOGY REPORT (SQ)
EXAM DESCRIPTION:  CHEST SINGLE VIEW



COMPLETED DATE/TIME:  4/19/2018 11:47 am



REASON FOR STUDY:  cp



COMPARISON:  1/2/2017



EXAM PARAMETERS:  NUMBER OF VIEWS: One view.

TECHNIQUE: Single frontal radiographic view of the chest acquired.

RADIATION DOSE: NA

LIMITATIONS: None.



FINDINGS:  LUNGS AND PLEURA: No opacities, masses or pneumothorax. No pleural effusion.

MEDIASTINUM AND HILAR STRUCTURES: No masses.  Contour normal.

HEART AND VASCULAR STRUCTURES: Stable heart size.

BONES: No acute findings.

HARDWARE: CABG.

OTHER: No other significant finding.



IMPRESSION:  NO ACUTE RADIOGRAPHIC FINDING IN THE CHEST.



TECHNICAL DOCUMENTATION:  JOB ID:  [id number]

 2011 [employer]- All Rights Reserved



Reading location - IP/workstation name: [email or web address]

## 2018-04-19 NOTE — ER DOCUMENT REPORT
ED General





- General


Chief Complaint: Breathing Difficulty


Stated Complaint: POSSIBLE POISONING


Time Seen by Provider: 04/19/18 10:46


Mode of Arrival: Ambulatory


TRAVEL OUTSIDE OF THE [place] IN LAST 30 DAYS: No





- HPI


Patient complains to provider of: Chest pain difficulty breathing concern for 

poisoning


Notes: 





Patient coming in today for acute onset of abdominal pain nausea vomiting 

shortness of breath chest pain substernal.  Patient states started after 

drinking his coffee this morning patient states made his cough he knows that 

his coffee cup is in a different spot patient states that he drinks coffee this 

morning smelt like bug spray was in his coffee.  Patient states he is currently 

undergoing a divorce and is concerned that his wife is poisoning him with bug 

spray.  Otherwise patient is resting comfortably with no other symptoms.  

Denies any fevers chills at this time.





- Related Data


Allergies/Adverse Reactions: 


 





Penicillins Allergy (Unknown, Verified 10/17/17 12:20)


 


Sulfa (Sulfonamide Antibiotics) Allergy (Verified 10/17/17 12:20)


 











Past Medical History





- General


Information source: Patient





- Social History


Smoking Status: Current Every Day Smoker


Cigarette use (# per day): No


Chew tobacco use (# tins/day): No


Frequency of alcohol use: None


Drug Abuse: None


Lives with: Family


Family History: Reviewed & Not Pertinent


Patient has suicidal ideation: No


Patient has homicidal ideation: No





- Past Medical History


Cardiac Medical History: Reports: Hx Heart Attack, Hx Hypercholesterolemia, Hx 

Hypertension


   Denies: Hx Coronary Artery Disease


Pulmonary Medical History: Reports: Hx COPD


Endocrine Medical History: Denies: Hx Diabetes Mellitus Type 1, Hx Diabetes 

Mellitus Type 2


Renal/ Medical History: Denies: Hx Peritoneal Dialysis


Psychiatric Medical History: Reports: Hx Depression


Traumatic Medical History: Reports: Hx Fractures - skull


Past Surgical History: Reports: Hx Cardiac Surgery - 5 vessel bypass, Other - 

bladder





- Immunizations


Hx Diphtheria, Pertussis, Tetanus Vaccination: No





Review of Systems





- Review of Systems


Constitutional: No symptoms reported


EENT: No symptoms reported


Cardiovascular: Chest pain


Respiratory: No symptoms reported


Gastrointestinal: Abdominal pain, Diarrhea, Nausea, Vomiting


Genitourinary: No symptoms reported


Male Genitourinary: No symptoms reported


Musculoskeletal: No symptoms reported


Skin: No symptoms reported


Hematologic/Lymphatic: No symptoms reported


Neurological/Psychological: No symptoms reported


-: Yes All other systems reviewed and negative





Physical Exam





- Vital signs


Vitals: 


 











Temp Pulse Resp BP Pulse Ox


 


 97.8 F   72   18   178/99 H  97 


 


 04/19/18 10:29  04/19/18 10:29  04/19/18 10:29  04/19/18 10:29  04/19/18 10:29











Interpretation: Hypertensive





- General


General appearance: Appears well, Alert





- HEENT


Head: Normocephalic, Atraumatic


Eyes: Normal


Pupils: PERRL





- Respiratory


Respiratory status: No respiratory distress


Chest status: Nontender


Breath sounds: Normal


Chest palpation: Normal





- Cardiovascular


Rhythm: Regular


Heart sounds: Normal auscultation


Murmur: No





- Abdominal


Inspection: Normal


Distension: No distension


Bowel sounds: Normal


Tenderness: Nontender


Organomegaly: No organomegaly





- Back


Back: Normal, Nontender





- Extremities


General upper extremity: Normal inspection, Nontender, Normal color, Normal ROM

, Normal temperature


General lower extremity: Normal inspection, Nontender, Normal color, Normal ROM

, Normal temperature, Normal weight bearing.  No: Olga's sign





- Neurological


Neuro grossly intact: Yes


Cognition: Normal


Orientation: AAOx4


[place] Coma Scale Eye Opening: Spontaneous


[place] Coma Scale Verbal: Oriented


[place] Coma Scale Motor: Obeys Commands


[place] Coma Scale Total: 15


Speech: Normal


Motor strength normal: LUE, RUE, LLE, RLE


Sensory: Normal





- Psychological


Associated symptoms: Normal affect, Normal mood





- Skin


Skin Temperature: Warm


Skin Moisture: Dry


Skin Color: Normal





Course





- Re-evaluation


Re-evalutation: 





04/19/18 15:20


Patient's vital signs shows hypertension patient states that he has not taken 

any of his blood pressure medication in the last 2-3 weeks because he is out.  

We are able to find the patient was on lisinopril had chlorothiazide 

metoprolol.  Did give the patient a refill on these medications.  After waiting 

here in ER patient requesting to be released is that he has to walk on.  

Patient does not want stay any longer for any further testing.  Patient 

laboratory studies otherwise not show any acute pathology at this time.





- Vital Signs


Vital signs: 


 











Temp Pulse Resp BP Pulse Ox


 


 97.8 F   72   23 H  99/51 L  97 


 


 04/19/18 10:29  04/19/18 10:29  04/19/18 12:30  04/19/18 12:30  04/19/18 12:26














- Laboratory


Result Diagrams: 


 04/19/18 11:21





 04/19/18 11:21


Laboratory results interpreted by me: 


 











  04/19/18





  11:21


 


WBC  12.8 H


 


RDW  14.4 H


 


Absolute Neutrophils  9.6 H














Discharge





- Discharge


Clinical Impression: 


 Noncompliance with medication regimen





Chest pain, unspecified


Qualifiers:


 Chest pain type: unspecified Qualified Code(s): R07.9 - Chest pain, unspecified





Hyperlipidemia


Qualifiers:


 Hyperlipidemia type: unspecified Qualified Code(s): E78.5 - Hyperlipidemia, 

unspecified





Hypertension


Qualifiers:


 Hypertension type: unspecified Qualified Code(s): I10 - Essential (primary) 

hypertension





Condition: Good


Disposition: HOME, SELF-CARE


Instructions:  Chest Pain of Unclear Cause ([facility]), High Blood Pressure, 

Requiring Treatment ([facility])


Additional Instructions: 


Your physical examination today is not consistent with any signs of acute 

pesticide poisoning.  Your laboratory studies also did not show any signs to 

explain your chest pain.  Your blood pressure was elevated more likely due to 

not taking her medications.  I will refill her medications at this time.  

Please take them as prescribed return to ER for any concerning issues.


Prescriptions: 


Simvastatin [Zocor 20 mg Tablet] 20 mg PO QHS #30 tablet


Lisinopril/Hydrochlorothiazide [Lisinopril-Hctz 20-25 mg Tab] 1 each PO DAILY #

30 tablet


Metoprolol Succinate [Toprol Xl] 25 mg PO DAILY #30 tab.sr.24h


Forms:  Return to Work